# Patient Record
Sex: MALE | Race: WHITE
[De-identification: names, ages, dates, MRNs, and addresses within clinical notes are randomized per-mention and may not be internally consistent; named-entity substitution may affect disease eponyms.]

---

## 2019-06-01 ENCOUNTER — HOSPITAL ENCOUNTER (EMERGENCY)
Dept: HOSPITAL 11 - JP.ED | Age: 54
Discharge: HOME | End: 2019-06-01
Payer: COMMERCIAL

## 2019-06-01 DIAGNOSIS — Z79.84: ICD-10-CM

## 2019-06-01 DIAGNOSIS — I10: ICD-10-CM
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
## 2019-06-01 DIAGNOSIS — S30.861A: Primary | ICD-10-CM

## 2019-06-01 DIAGNOSIS — E78.00: ICD-10-CM

## 2019-06-01 DIAGNOSIS — E11.9: ICD-10-CM

## 2019-06-01 DIAGNOSIS — Z79.82: ICD-10-CM

## 2019-06-01 DIAGNOSIS — Z79.899: ICD-10-CM

## 2019-06-01 NOTE — EDM.PDOC
ED HPI GENERAL MEDICAL PROBLEM





- General


Chief Complaint: Bite:Animal, Insect


Stated Complaint: TICK BITE GROIN AREA


Time Seen by Provider: 06/01/19 14:35


Source of Information: Reports: Patient, Family, RN Notes Reviewed





- History of Present Illness


INITIAL COMMENTS - FREE TEXT/NARRATIVE: 





Patient presents with tick bite to right groin with bulls-eye rash he noticed 

this morning.  [place] tick brought in for provider to look at.  





- Related Data


 Allergies











Allergy/AdvReac Type Severity Reaction Status Date / Time


 


Unable to Assess Allergy   Unverified 06/01/19 14:35











Home Meds: 


 Home Meds





Aspirin [Halfprin] 81 mg PO DAILY 06/01/19 [History]


Lisinopril 10 mg PO DAILY 06/01/19 [History]


Lovastatin 20 mg PO DAILY 06/01/19 [History]


metFORMIN [Glucophage] 500 mg PO BID 06/01/19 [History]











Past Medical History


HEENT History: Reports: Impaired Vision


Cardiovascular History: Reports: High Cholesterol, Hypertension


Genitourinary History: Reports: Renal Calculus


Musculoskeletal History: Reports: Back Pain, Chronic


Endocrine/Metabolic History: Reports: Diabetes, Type II


Oncologic (Cancer) History: Reports: Other (See Below)


Other Oncologic History: skin





- Past Surgical History


Head Surgeries/Procedures: Reports: None


HEENT Surgical History: Reports: Tonsillectomy


Cardiovascular Surgical History: Reports: None


Endocrine Surgical History: Reports: None


Musculoskeletal Surgical History: Reports: None


Oncologic Surgical History: Reports: None


Dermatological Surgical History: Reports: None





Social & Family History





- Tobacco Use


Smoking Status *Q: Never Smoker


Second Hand Smoke Exposure: No





- Caffeine Use


Caffeine Use: Reports: Soda





- Recreational Drug Use


Recreational Drug Use: No





ED ROS GENERAL





- Review of Systems


Review Of Systems: See Below


Constitutional: Reports: No Symptoms


HEENT: Reports: No Symptoms


Respiratory: Reports: No Symptoms


Cardiovascular: Reports: No Symptoms


Endocrine: Reports: No Symptoms


GI/Abdominal: Reports: No Symptoms


Musculoskeletal: Reports: No Symptoms


Skin: Reports: Rash


Neurological: Reports: No Symptoms


Psychiatric: Reports: No Symptoms


Hematologic/Lymphatic: Reports: No Symptoms


Immunologic: Reports: No Symptoms





ED EXAM, ANIMAL BITE





- Physical Exam


Exam: See Below


Text/Narrative:: 





Joseph presents today for complaints of deer tick bite to right groin with 

pain and bulls eye rash for 1 day. 


General Appearance: Alert, WD/WN


Eye Exam: Bilateral Eye: EOMI, PERRL


Ears: Normal External Exam


Head: Atraumatic, Normocephalic


Respiratory/Chest: No Respiratory Distress, Lungs Clear, Normal Breath Sounds, 

No Accessory Muscle Use, Chest Non-Tender


Cardiovascular: Normal Peripheral Pulses, Regular Rate, Rhythm, No Edema, No 

Gallop, No Murmur, No Rub


Peripheral Pulses: 2+: Radial (L), Radial (R), Dorsalis Pedis (L), Dorsalis 

Pedis (R)


GI/Abdominal: Normal Bowel Sounds, Soft, Non-Tender, No Mass


 (Male) Exam: No Hernia, Normal Inspection


Back Exam: Normal Inspection, Full Range of Motion.  No: CVA Tenderness (R), 

CVA Tenderness (L)


Extremities: Normal Inspection, Normal Range of Motion, Non-Tender, No Pedal 

Edema, Normal Capillary Refill


Neurological: Alert, Oriented, CN II-XII Intact, Normal Cognition, Normal Gait, 

Normal Reflexes, No Motor/Sensory Deficits


Psychiatric: Normal Affect, Normal Mood


Skin Exam: Warm/Dry, Other (noted bulls eye rash to right groin with central 

scabbing.  Tick brought in identified as deer tick.  )


Lymphadenopathy: Bilateral: No Adenopathy


Lymphatic: No Adenopathy





Course





- Vital Signs


Last Recorded V/S: 


 Last Vital Signs











Temp  36.9 C   06/01/19 14:38


 


Pulse  82   06/01/19 14:38


 


Resp  16   06/01/19 14:38


 


BP  140/100 H  06/01/19 14:38


 


Pulse Ox  97   06/01/19 14:38














Departure





- Departure


Time of Disposition: 14:48


Disposition: Home, Self-Care 01


Condition: Good


Clinical Impression: 


 Tick bite








- Discharge Information


*PRESCRIPTION DRUG MONITORING PROGRAM REVIEWED*: Not Applicable


*COPY OF PRESCRIPTION DRUG MONITORING REPORT IN PATIENT HARMONY: Not Applicable


Instructions:  Tick Bite Information, Adult, Easy-to-Read


Referrals: 


PCP,None [Primary Care Provider] - 


Forms:  ED Department Discharge


Additional Instructions: 


Take doxycycline 100mg by mouth twice per day for 10 days for tick bite


Take ibuprofen 800mg by mouth three times a day for pain as needed


Can also take tylenol 1000mg by mouth three times a day as needed





Follow up with primary for any worsening, issues or concerns. 





- Assessment/Plan


Assessment:: 





tick bite


Plan: 





Take doxycycline 100mg by mouth twice per day for 10 days for tick bite


Take ibuprofen 800mg by mouth three times a day for pain as needed


Can also take tylenol 1000mg by mouth three times a day as needed





Follow up with primary for any worsening, issues or concerns.